# Patient Record
Sex: FEMALE | Race: WHITE | NOT HISPANIC OR LATINO | Employment: FULL TIME | URBAN - METROPOLITAN AREA
[De-identification: names, ages, dates, MRNs, and addresses within clinical notes are randomized per-mention and may not be internally consistent; named-entity substitution may affect disease eponyms.]

---

## 2017-05-02 ENCOUNTER — OFFICE VISIT (OUTPATIENT)
Dept: OBGYN | Facility: CLINIC | Age: 22
End: 2017-05-02
Payer: COMMERCIAL

## 2017-05-02 VITALS
WEIGHT: 142.1 LBS | SYSTOLIC BLOOD PRESSURE: 122 MMHG | DIASTOLIC BLOOD PRESSURE: 86 MMHG | HEART RATE: 71 BPM | OXYGEN SATURATION: 100 % | BODY MASS INDEX: 19.54 KG/M2 | TEMPERATURE: 97.7 F

## 2017-05-02 DIAGNOSIS — Z30.011 ENCOUNTER FOR INITIAL PRESCRIPTION OF CONTRACEPTIVE PILLS: Primary | ICD-10-CM

## 2017-05-02 PROCEDURE — 99203 OFFICE O/P NEW LOW 30 MIN: CPT | Performed by: OBSTETRICS & GYNECOLOGY

## 2017-05-02 RX ORDER — NORGESTIMATE AND ETHINYL ESTRADIOL 7DAYSX3 28
1 KIT ORAL DAILY
Qty: 84 TABLET | Refills: 3 | Status: SHIPPED | OUTPATIENT
Start: 2017-05-02 | End: 2018-03-19

## 2017-05-02 NOTE — PROGRESS NOTES
Crystal is a 21 year old  referred here by self for consultation regarding contraception.  She is getting  on 17. Has never been sexually active. Menses surjit regular. LMP of 17  Denies any pelvic pains or problems. No abnormal vaginal discharge.    ROS: Ten point review of systems was reviewed and negative except the above.    Gyne: - abn pap (last pap ), - STD's    Past Medical History:   Diagnosis Date     Pneumonia due to other specified bacteria(482.89)     X2     History reviewed. No pertinent surgical history.  Patient Active Problem List   Diagnosis     Other affections of shoulder region, not elsewhere classified     Pain in joint, shoulder region     Fibroma of bone-nonossifying      Leg pain, medial     Molluscum contagiosum     Acne       ALL/Meds: Her medication and allergy histories were reviewed and are documented in their appropriate chart areas.    SH: - tob, - EtOH, single    FH: Her family history was reviewed and documented in its appropriate chart area.    PE: /86  Pulse 71  Temp 97.7  F (36.5  C) (Oral)  Wt 64.5 kg (142 lb 1.6 oz)  LMP 2017  SpO2 100%  BMI 19.54 kg/m2  Body mass index is 19.54 kg/(m^2).      General:  WNWD female, NAD  Alert  Oriented x 3  Gait:  Normal  Skin:  Normal skin turgor  HEENT:  NC/AT, EOMI  Abdomen:  Non-tender, non-distended.  Pelvic exam:  Not performed  Extremities:  No clubbing, no cyanosis and no edema.      A/P    ICD-10-CM    1. Encounter for initial prescription of contraceptive pills Z30.011 norgestim-eth estrad triphasic (TRINESSA, 28,) 0.18/0.215/0.25 MG-35 MCG per tablet     Reviewed the risks, benefits , alternatives and side effects of birth control options including abstinence, oral contraceptives, transdermal patch, transvaginal ring, Depo-Provera, IUD, hormonal implants, condoms, diaphrams,and permanent sterilization.  Reviewed need for back-up contraception for the first month of hormonal methods.  Reviewed that  only abstinence and condoms provide protection from STD's.  Patient desires combination pills  for birth control.     She will take the first month continueosly to hopefully prevent menses on her honeymoon.   Discussed possible breakthrough bleeding.    25 minutes was spent face to face with the patient today discussing her history, diagnosis, and follow-up plan as noted above.  Over 50% of the visit was spent in counseling and coordination of care.    Total Visit Time: 30 minutes.      CEPHAS AGBEH, MD.

## 2017-05-02 NOTE — NURSING NOTE
"Chief Complaint   Patient presents with     Consult     Birth Control       Initial /86  Pulse 71  Temp 97.7  F (36.5  C) (Oral)  Wt 64.5 kg (142 lb 1.6 oz)  LMP 04/30/2017  SpO2 100%  BMI 19.54 kg/m2 Estimated body mass index is 19.54 kg/(m^2) as calculated from the following:    Height as of 7/2/13: 1.816 m (5' 11.5\").    Weight as of this encounter: 64.5 kg (142 lb 1.6 oz).  Medication Reconciliation: complete   Radhika Cervantes CMA      "

## 2017-05-02 NOTE — MR AVS SNAPSHOT
After Visit Summary   5/2/2017    Crystal Thurston    MRN: [id number]           Patient Information     Date Of Birth          1995        Visit Information        Provider Department      5/2/2017 3:30 PM Agbeh, Cephas Mawuena, MD [facility]        Today's Diagnoses     Encounter for initial prescription of contraceptive pills    -  1      Care Instructions                                                           If you have any questions regarding your visit, Please contact your care team.    Women s Health CLINIC HOURS TELEPHONE NUMBER   Cephas Agbeh, M.D.    Jayda Sher - RN    Natasha -         Monday-[facility]  8:00 am - 5 pm  Tuesday- [facility]  8:00am- 5 pm  Wednesday- Off  Thursday- [facility]  8:00 am- 5 pm  Friday-[place]  8:00 am 5 pm [facility]  [address]  [place], [place] [id number] [phone] ask for Women's Clinic    Imaging [phone]    [facility]  [address]  [place] [address] [phone]  Imaging [phone]     Urgent Care locations:    [facility] Saturday and Sunday   9 am - 5 pm    Monday-Friday   12 pm - 8 pm  Saturday and Sunday   9 am - 5 pm   [phone]       If you need a medication refill, please contact your pharmacy. Please allow 3 business days for your refill to be completed.  As always, Thank you for trusting us with your healthcare needs!                Follow-ups after your visit        Who to contact     If you have questions or need follow up information about today's clinic visit or your schedule please contact [facility] directly at [phone].  Normal or non-critical lab and imaging results will be communicated to you by MyChart, letter or phone within 4 business days after the clinic has received the results. If you do not hear from us within 7 days, please  contact the clinic through [employer] or phone. If you have a critical or abnormal lab result, we will notify you by phone as soon as possible.  Submit refill requests through [employer] or call your pharmacy and they will forward the refill request to us. Please allow 3 business days for your refill to be completed.          Additional Information About Your Visit        [employer] Information     [employer] gives you secure access to your electronic health record. If you see a primary care provider, you can also send messages to your care team and make appointments. If you have questions, please call your primary care clinic.  If you do not have a primary care provider, please call [phone] and they will assist you.        Care EveryWhere ID     This is your Care EveryWhere ID. This could be used by other organizations to access your [facility] medical records  [id number]        Your Vitals Were     Pulse Temperature Last Period Pulse Oximetry BMI (Body Mass Index)       71 97.7  F (36.5  C) (Oral) 04/30/2017 100% 19.54 kg/m2        Blood Pressure from Last 3 Encounters:   05/02/17 122/86   07/02/13 116/80   05/15/13 110/70    Weight from Last 3 Encounters:   05/02/17 64.5 kg (142 lb 1.6 oz)   07/02/13 68 kg (150 lb) (83 %)*   05/15/13 68.1 kg (150 lb 3.2 oz) (83 %)*     * Growth percentiles are based on [facility] 2-20 Years data.              Today, you had the following     No orders found for display         Today's Medication Changes          These changes are accurate as of: 5/2/17  4:39 PM.  If you have any questions, ask your nurse or doctor.               Start taking these medicines.        Dose/Directions    norgestim-eth estrad triphasic 0.18/0.215/0.25 MG-35 MCG per tablet   Commonly known as:  TRINESSA (28)   Used for:  Encounter for initial prescription of contraceptive pills   Started by:  Agbeh, Cephas Mawuena, MD        Dose:  1 tablet   Take 1 tablet by mouth daily   Quantity:  84 tablet   Refills:  3             Where to get your medicines      These medications were sent to [facility] [address] [facility] - [place] - [address]  [address] [place] [place] [address]     Phone:  [phone]     norgestim-eth estrad triphasic 0.18/0.215/0.25 MG-35 MCG per tablet                Primary Care Provider Office Phone # Fax #    [facility] [phone] [address]  [place] [address]        Thank you!     Thank you for choosing [facility]  for your care. Our goal is always to provide you with excellent care. Hearing back from our patients is one way we can continue to improve our services. Please take a few minutes to complete the written survey that you may receive in the mail after your visit with us. Thank you!             Your Updated Medication List - Protect others around you: Learn how to safely use, store and throw away your medicines at www.disposemymeds.org.          This list is accurate as of: 5/2/17  4:39 PM.  Always use your most recent med list.                   Brand Name Dispense Instructions for use    albuterol 108 (90 BASE) MCG/ACT Inhaler    PROAIR HFA/PROVENTIL HFA/VENTOLIN HFA    1 Inhaler    Inhale 2 puffs into the lungs every 6 hours as needed for shortness of breath / dyspnea (use before exercise).       clindamycin 1 % lotion    CLEOCIN T    60 mL    To irritated, red areas of armpits and legs daily after shaving until resolved.       desonide 0.05 % lotion    DESOWEN    60 mL    Apply topically 2 times daily To affected areas at the armpits, back, and legs until resolved.       IBUPROFEN      2-3 tablets as needed Reported on 5/2/2017       norgestim-eth estrad triphasic 0.18/0.215/0.25 MG-35 MCG per tablet    TRINESSA (28)    84 tablet    Take 1 tablet by mouth daily       sertraline 50 MG tablet    ZOLOFT     Take 75 mg by mouth       tretinoin 0.025 % cream    RETIN-A    45 g    Use every night       TYLENOL 8 HOUR PO       Take by mouth as needed Reported on 5/2/2017

## 2017-05-02 NOTE — PATIENT INSTRUCTIONS
If you have any questions regarding your visit, Please contact your care team.    Women s Health CLINIC HOURS TELEPHONE NUMBER   Gms Agbeh, M.D.    Jayda Armstrong- KRISTINA Sher - KRISTINA Kaur -         Monday-[facility]  8:00 am - 5 pm  Tuesday- [facility]  8:00am- 5 pm  Wednesday- Off  Thursday- [facility]  8:00 am- 5 pm  Friday-[place]  8:00 am 5 pm [facility]  [address] [place] [address]  [phone] ask for Women's [facility]    Imaging [phone]    [facility]  [address]  [place] [address] [phone]  Imaging [phone]     Urgent Care locations:    [facility] Saturday and Sunday   9 am - 5 pm    Monday-Friday   12 pm - 8 pm  Saturday and Sunday   9 am - 5 pm   [phone]       If you need a medication refill, please contact your pharmacy. Please allow 3 business days for your refill to be completed.  As always, Thank you for trusting us with your healthcare needs!

## 2017-09-17 ENCOUNTER — HEALTH MAINTENANCE LETTER (OUTPATIENT)
Age: 22
End: 2017-09-17

## 2018-03-12 DIAGNOSIS — Z30.011 ENCOUNTER FOR INITIAL PRESCRIPTION OF CONTRACEPTIVE PILLS: ICD-10-CM

## 2018-03-12 RX ORDER — NORGESTIMATE AND ETHINYL ESTRADIOL 7DAYSX3 28
1 KIT ORAL DAILY
Qty: 84 TABLET | Refills: 3 | Status: CANCELLED | OUTPATIENT
Start: 2018-03-12

## 2018-03-12 NOTE — TELEPHONE ENCOUNTER
Requested Prescriptions   Pending Prescriptions Disp Refills     norgestim-eth estrad triphasic (TRINESSA, 28,) 0.18/0.215/0.25 MG-35 MCG per tablet  Last Written Prescription Date:  12/20/17  Last Fill Quantity: 84,  # refills: 2   Last office visit: 5/2/2017 with prescribing provider:  C. Agbeh   Future Office Visit:     84 tablet 3     Sig: Take 1 tablet by mouth daily    There is no refill protocol information for this order

## 2018-03-19 ENCOUNTER — TELEPHONE (OUTPATIENT)
Dept: OBGYN | Facility: CLINIC | Age: 23
End: 2018-03-19

## 2018-03-19 DIAGNOSIS — Z30.011 ENCOUNTER FOR INITIAL PRESCRIPTION OF CONTRACEPTIVE PILLS: ICD-10-CM

## 2018-03-19 RX ORDER — NORGESTIMATE AND ETHINYL ESTRADIOL 7DAYSX3 28
1 KIT ORAL DAILY
Qty: 84 TABLET | Refills: 0 | Status: SHIPPED | OUTPATIENT
Start: 2018-03-19

## 2018-03-19 NOTE — TELEPHONE ENCOUNTER
norgestim-eth estrad triphasic (TRINESSA, 28,) 0.18/0.215/0.25 MG-35 MCG per tablet 84 tablet 3 5/2/2017  --   Sig: Take 1 tablet by mouth daily     Last WWE was on 05-02-17  Phone call to pharmacy at 1045 and spoke to Suzette at [facility]. She stated Rx was transferred to [facility].   Left message for patient that it seems she is needing a RF as she started BCP consecutively in the beginning in attempt to postpone her period on her honeymoon. Left message that RF is being sent to her pharmacy and reminded patient that she needs to be seen for WWE on or after 05-03-18. Left call back number to schedule an appt.   Prescription approved per [facility] Refill Protocol.  Nikky Pandey RN, BAN

## 2020-02-23 ENCOUNTER — HEALTH MAINTENANCE LETTER (OUTPATIENT)
Age: 25
End: 2020-02-23

## 2020-09-18 ENCOUNTER — MEDICAL CORRESPONDENCE (OUTPATIENT)
Dept: HEALTH INFORMATION MANAGEMENT | Facility: CLINIC | Age: 25
End: 2020-09-18

## 2020-09-18 ENCOUNTER — TRANSFERRED RECORDS (OUTPATIENT)
Dept: HEALTH INFORMATION MANAGEMENT | Facility: CLINIC | Age: 25
End: 2020-09-18

## 2020-09-24 ENCOUNTER — TELEPHONE (OUTPATIENT)
Dept: OTOLARYNGOLOGY | Facility: CLINIC | Age: 25
End: 2020-09-24

## 2020-09-24 ENCOUNTER — TRANSCRIBE ORDERS (OUTPATIENT)
Dept: OTHER | Age: 25
End: 2020-09-24

## 2020-09-24 DIAGNOSIS — R09.89 THROAT CLEARING: ICD-10-CM

## 2020-09-24 DIAGNOSIS — R05.9 COUGH: Primary | ICD-10-CM

## 2020-09-24 NOTE — TELEPHONE ENCOUNTER
[facility] Call Center    Phone Message    May a detailed message be left on voicemail: yes     Reason for Call: Appointment Intake    Referring Provider Name: Referred by Brenton CRUZ  Diagnosis and/or Symptoms: Cough   Throat clearing     Referred to Dr Carcamo who is listed under Cough but not Throat Clearing and Pt said biggest issue is Throat Clearing - she is willing to see anyone that sees for this so not just Dr Carcamo - she wants first available in person - when I went to do the Panel it is still broken - notified Isa and she said to let you know to call Pt to please schedule since Throat Panel scheduling template broken still - Pt declined Virtual Video visit as has been elsewhere and said needs Throat scope in person - please return call to schedule - Thanks - Pt will wait for call back for first available in person with any MD ENT provider for Throat Clearing - Thanks      Action Taken: Message routed to:  [facility] ENT    Travel Screening: Not Applicable

## 2020-09-25 NOTE — TELEPHONE ENCOUNTER
Called patient and left a VM.    Informed her that - at this time - the closest that we can get to an in person visit for the initial appointment w/ the throat and voice team is a HYBRID appointment with Dr. Carcamo. Explained that Dr. Carcamo will conduct the first half of the appointment via video, and then she will come into the room to assess throat.    Let patient know that I did go ahead and schedule for 11/09 with Dr. Carcamo to ensure that she is down for something (Dr Carcamo tends to book out), and provided appointment/clinic instructions, as well as the ENT call center number to call back if questions do arise.

## 2020-09-28 NOTE — TELEPHONE ENCOUNTER
FUTURE VISIT INFORMATION      FUTURE VISIT INFORMATION:    Date: 11/9/2020    Time: 3PM    Location: [facility]  REFERRAL INFORMATION:    Referring provider:  Brenton Horan    Referring providers clinic:  [facility]    Reason for visit/diagnosis  Cough, throat clearing - referred by Dr. Horan ([facility]), SAL slot use ok'd by Brinda    RECORDS REQUESTED FROM:       Clinic name Comments Records Status Imaging Status   [facility] 9/18/2020 note with Dr Brenton Horan req 9/28/2020  Sent to scan 9/29/2020    [facility]   10/8/2020 PH Monitoring with Vinny Figueroa MD  9/23/2020 Upper GI endoscopy  with Vinny Figueroa MD  Care Everywhere     [facility]   9/14/2020  Note from Ac Mei MD   Care Everywhere     [facility] 08/27/2020 note from Rahda Wise, JACKELYN, CNP   Care Everywhere    [facility] imaging  9/24/2020 CT Angio Chest  Care Everywhere req 9/28/2020 - PACS   [facility] imaging  1/25/2020 XR Chest  Care Everywhere req 9/28/2020 - PACS   ENT [facility] 10/13/2020 note from Leslie Hagen MD Care Everywhere            9/28/2020 10:48AM sent a fax to [facility] for recs, [facility] for images and [employer] [facility] for images - Amay   9/29/2020 10:152AM [facility] only has office notes with them, Upper Endcopsy was done with [facility] (care everywhere ), waiting for images- Amay   10/1/2020 11:27AM images from [facility] and [facility] in PACS - Amay

## 2020-11-09 ENCOUNTER — PRE VISIT (OUTPATIENT)
Dept: OTOLARYNGOLOGY | Facility: CLINIC | Age: 25
End: 2020-11-09

## 2020-12-06 ENCOUNTER — HEALTH MAINTENANCE LETTER (OUTPATIENT)
Age: 25
End: 2020-12-06

## 2020-12-29 ENCOUNTER — APPOINTMENT (OUTPATIENT)
Dept: URBAN - METROPOLITAN AREA CLINIC 259 | Age: 25
Setting detail: DERMATOLOGY
End: 2020-12-29

## 2020-12-29 DIAGNOSIS — R20.8 OTHER DISTURBANCES OF SKIN SENSATION: ICD-10-CM

## 2020-12-29 DIAGNOSIS — D22 MELANOCYTIC NEVI: ICD-10-CM

## 2020-12-29 PROBLEM — D48.5 NEOPLASM OF UNCERTAIN BEHAVIOR OF SKIN: Status: ACTIVE | Noted: 2020-12-29

## 2020-12-29 PROCEDURE — 11306 SHAVE SKIN LESION 0.6-1.0 CM: CPT | Mod: 76

## 2020-12-29 PROCEDURE — 11306 SHAVE SKIN LESION 0.6-1.0 CM: CPT

## 2020-12-29 PROCEDURE — 11301 SHAVE SKIN LESION 0.6-1.0 CM: CPT

## 2020-12-29 PROCEDURE — 99202 OFFICE O/P NEW SF 15 MIN: CPT | Mod: 25

## 2020-12-29 PROCEDURE — OTHER SHAVE REMOVAL: OTHER

## 2020-12-29 PROCEDURE — OTHER COUNSELING: OTHER

## 2020-12-29 ASSESSMENT — LOCATION DETAILED DESCRIPTION DERM
LOCATION DETAILED: RIGHT RIB CAGE
LOCATION DETAILED: RIGHT LATERAL TRAPEZIAL NECK
LOCATION DETAILED: EPIGASTRIC SKIN
LOCATION DETAILED: MID TRAPEZIAL NECK

## 2020-12-29 ASSESSMENT — LOCATION ZONE DERM
LOCATION ZONE: NECK
LOCATION ZONE: TRUNK

## 2020-12-29 ASSESSMENT — LOCATION SIMPLE DESCRIPTION DERM
LOCATION SIMPLE: POSTERIOR NECK
LOCATION SIMPLE: ABDOMEN
LOCATION SIMPLE: TRAPEZIAL NECK

## 2020-12-29 NOTE — PROCEDURE: SHAVE REMOVAL
Anesthesia Type: 1% lidocaine with epinephrine
X Size Of Lesion In Cm (Optional): 0
Anesthesia Volume In Cc: 0.5
Medical Necessity Information: It is in your best interest to select a reason for this procedure from the list below. All of these items fulfill various CMS LCD requirements except the new and changing color options.
Add Variable For Additional Medical Justification: No
Was A Bandage Applied: Yes
Hemostasis: Aluminum Chloride
Path Notes (To The Dermatopathologist): Please check margins.
Biopsy Method: Dermablade
Medical Necessity Clause: This procedure was medically necessary because the lesion that was treated was:
Post-Care Instructions: I reviewed with the patient in detail post-care instructions. Patient is to keep the biopsy site dry overnight, and then apply bacitracin twice daily until healed. Patient may apply hydrogen peroxide soaks to remove any crusting.
Billing Type: Third-Party Bill
Size Of Lesion In Cm (Required): 0.6
Wound Care: Petrolatum
Detail Level: Detailed
Notification Instructions: Patient will be notified of pathology results. However, patient instructed to call the office if not contacted within 2 weeks.
Consent was obtained from the patient. The risks and benefits to therapy were discussed in detail. Specifically, the risks of infection, scarring, bleeding, prolonged wound healing, incomplete removal, allergy to anesthesia, nerve injury and recurrence were addressed. Prior to the procedure, the treatment site was clearly identified and confirmed by the patient. All components of Universal Protocol/PAUSE Rule completed.

## 2021-04-11 ENCOUNTER — HEALTH MAINTENANCE LETTER (OUTPATIENT)
Age: 26
End: 2021-04-11

## 2021-06-14 ENCOUNTER — HOSPITAL ENCOUNTER (EMERGENCY)
Facility: CLINIC | Age: 26
Discharge: HOME | End: 2021-06-14
Admitting: EMERGENCY MEDICINE
Payer: COMMERCIAL

## 2021-06-14 VITALS
HEART RATE: 101 BPM | TEMPERATURE: 98 F | OXYGEN SATURATION: 98 % | DIASTOLIC BLOOD PRESSURE: 73 MMHG | SYSTOLIC BLOOD PRESSURE: 133 MMHG | RESPIRATION RATE: 18 BRPM

## 2021-06-14 PROCEDURE — 93005 ELECTROCARDIOGRAM TRACING: CPT

## 2021-06-14 PROCEDURE — 999N000104 HC STATISTIC NO CHARGE

## 2021-06-14 NOTE — ED TRIAGE NOTES
Patient complaining of palpitations for the past few days.  States she recently increased bethanechol and feels this may be contributing.

## 2021-06-14 NOTE — ED NOTES
Pt reports she has been having steady palpations without irregular beats. Pt to follow up with PCP in AM.

## 2021-06-15 LAB — INTERPRETATION ECG - MUSE: NORMAL

## 2021-09-25 ENCOUNTER — HEALTH MAINTENANCE LETTER (OUTPATIENT)
Age: 26
End: 2021-09-25

## 2021-11-05 ENCOUNTER — TRANSFERRED RECORDS (OUTPATIENT)
Dept: HEALTH INFORMATION MANAGEMENT | Facility: CLINIC | Age: 26
End: 2021-11-05
Payer: COMMERCIAL

## 2021-11-07 ENCOUNTER — TRANSFERRED RECORDS (OUTPATIENT)
Dept: HEALTH INFORMATION MANAGEMENT | Facility: CLINIC | Age: 26
End: 2021-11-07
Payer: COMMERCIAL

## 2021-12-23 ENCOUNTER — PRE VISIT (OUTPATIENT)
Dept: UROLOGY | Facility: CLINIC | Age: 26
End: 2021-12-23

## 2021-12-23 NOTE — TELEPHONE ENCOUNTER
MEDICAL RECORDS REQUEST   [facility]  Urology Clinic  [address] [place] [address]  PHONE: [phone]  Fax: [phone]        FUTURE VISIT INFORMATION                                                   Crystal Flannery, : 1995 scheduled for future visit at [facility]    APPOINTMENT INFORMATION:    Date: 2022    Provider:  Skylar Palafox MD    Reason for Visit/Diagnosis: Pelvic Pain & Frequency    REFERRAL INFORMATION:    Referring provider:  N/A    Specialty: N/A    Referring providers clinic:  N/A    Clinic contact number:  N/A    RECORDS REQUESTED FOR VISIT                                                     NOTES  STATUS/DETAILS   OFFICE NOTE from referring provider  no   OFFICE NOTE from other specialist  yes   DISCHARGE SUMMARY from hospital  no   DISCHARGE REPORT from the ER  yes   OPERATIVE REPORT  yes   MEDICATION LIST  yes   LABS     URINALYSIS (UA)  yes   URINE CYTOLOGY  no   IMAGING (IMAGES & REPORT)  resolved -- 2021 -- CT ABD/Pelvis [facility]  2021 -- US Pelvis -- [facility]     PRE-VISIT CHECKLIST      Record collection complete yes   Appointment appropriately scheduled           (right time/right provider) Yes   Joint diagnostic appointment coordinated correctly          (ensure right order & amount of time) Yes   MyChart activation Yes   Questionnaire complete If no, please explain pending     Action 2021 JTV 12:45am   Action Taken [facility] sent a request to [facility] for images to be pushed over to [facility] PACS.      Action 2021 JTV 1:11pm   Action Taken [facility] sent a 2nd request to [facility] for images.    @ 3:30pm, [facility] received Images under different last name. [facility] called patient to confirm if she has another last name. Patient did not . [facility] LVM for patient to return call.     Images were pushed under: Crystal Thurston in PACS.     Action 2021  JTV 4:22pm   Action Taken [facility] received images from [facility] and [facility]. Images have been resolved.

## 2021-12-27 ENCOUNTER — PRE VISIT (OUTPATIENT)
Dept: UROLOGY | Facility: CLINIC | Age: 26
End: 2021-12-27
Payer: COMMERCIAL

## 2021-12-27 NOTE — TELEPHONE ENCOUNTER
Reason for Visit: Consult    Diagnosis: Pelvic Pain, Urinary Frequency    Orders/Procedures/Records: in system    Contact Patient: n/a    Rooming Requirements: Normal      Elycia Baeza  12/27/21  11:24 AM

## 2022-01-05 ENCOUNTER — VIRTUAL VISIT (OUTPATIENT)
Dept: UROLOGY | Facility: CLINIC | Age: 27
End: 2022-01-05
Payer: COMMERCIAL

## 2022-01-05 DIAGNOSIS — R10.2 PELVIC PAIN IN FEMALE: Primary | ICD-10-CM

## 2022-01-05 PROCEDURE — 99204 OFFICE O/P NEW MOD 45 MIN: CPT | Mod: 95 | Performed by: UROLOGY

## 2022-01-05 RX ORDER — HYDROXYZINE PAMOATE 25 MG/1
CAPSULE ORAL
COMMUNITY
Start: 2021-11-22

## 2022-01-05 RX ORDER — PANTOPRAZOLE SODIUM 20 MG/1
TABLET, DELAYED RELEASE ORAL
COMMUNITY
Start: 2021-04-08

## 2022-01-05 RX ORDER — DIAZEPAM 2.5 MG/.5ML
GEL RECTAL
Qty: 10 SUPPOSITORY | Refills: 0 | Status: SHIPPED | OUTPATIENT
Start: 2022-01-05 | End: 2022-04-13

## 2022-01-05 RX ORDER — DIAZEPAM 2.5 MG/.5ML
GEL RECTAL
Qty: 10 SUPPOSITORY | Refills: 0 | Status: SHIPPED | OUTPATIENT
Start: 2022-01-05 | End: 2022-01-05

## 2022-01-05 RX ORDER — OXYBUTYNIN CHLORIDE 5 MG/1
TABLET, EXTENDED RELEASE ORAL
COMMUNITY

## 2022-01-05 NOTE — PROGRESS NOTES
HPI:  Crystal Flannery is a 26 year old female being seen for bladder pain.  Her symptoms started Oct 21st, 2021.  Right before that she had been squatting and doing heavy lifting and exercising and she started to feel some urinary frequency and some discomfort after voiding.  She was prescribed antibiotics for 5 days and her symptoms resolved.  On Nov 2nd she started to do weight lifting again and the next morning she got her Covid Booster and she got the symptoms came back that afternoon.  The symptoms have persisted.  She had UA, Wet prep, u/s and CT scan all of which did not help her.    Reviewed previous notes from 11/7/21 from [facility] ED    Exam:  There were no vitals taken for this visit.  GENERAL: Healthy, alert and no distress  EYES: Eyes grossly normal to inspection.  No discharge or erythema, or obvious scleral/conjunctival abnormalities.  RESP: No audible wheeze, cough, or visible cyanosis.  No visible retractions or increased work of breathing.    SKIN: Visible skin clear. No significant rash, abnormal pigmentation or lesions.  NEURO: Cranial nerves grossly intact.  Mentation and speech appropriate for age.  PSYCH: Mentation appears normal, affect normal/bright, judgement and insight intact, normal speech and appearance well-groomed.    Review of Imaging:  The following imaging exams were independently viewed and interpreted by me and discussed with patient:  CT abd/pelvis 11/7/21  normal    Review of Labs:  The following labs were reviewed by me and discussed with the patient:  11/13/21 labs  Cr 0.84 BMP normal  HgbA1C 5.1  UA normal except elevated glucose       Assessment & Plan   27 y/o female with urinary frequency and pelvic pain related to pelvic floor dysfunction with some back and pelvic issues.  We discussed options of observation, PT, other medication, and vaginal valium.  She would like to try the vaginal valium and the PT for now.  -vaginal valium  -agree with PFPT, she is starting  tomorrow  -f/u in 3 months    Skylar Palafox MD  [facility] UROLOGY [facility]      ==========================      Additional Coding Information:  Time spent:  50 minutes spent on the date of the encounter doing chart review, history and exam, documentation and further activities per the note

## 2022-01-05 NOTE — LETTER
1/5/2022       RE: Crystal Flannery  [address]  [facility] [address]     Dear Colleague,    Thank you for referring your patient, Crystal Flannery, to the [facility] [place] [facility]. Please see a copy of my visit note below.    Crystal is a 26 year old who is being evaluated via a billable video visit.      How would you like to obtain your AVS? MyChart  If the video visit is dropped, the invitation should be resent by: Send to e-mail at: [email or web address]  Will anyone else be joining your video visit? No      Video Start Time: 1:29 PM  Video-Visit Details    Type of service:  Video Visit    Video End Time:1:48 PM    Originating Location (pt. Location): Home    Distant Location (provider location):  [facility] [place]     Platform used for Video Visit: PlaySight    HPI:  Crystal Flannery is a 26 year old female being seen for bladder pain.  Her symptoms started Oct 21st, 2021.  Right before that she had been squatting and doing heavy lifting and exercising and she started to feel some urinary frequency and some discomfort after voiding.  She was prescribed antibiotics for 5 days and her symptoms resolved.  On Nov 2nd she started to do weight lifting again and the next morning she got her Covid Booster and she got the symptoms came back that afternoon.  The symptoms have persisted.  She had UA, Wet prep, u/s and CT scan all of which did not help her.    Reviewed previous notes from 11/7/21 from [facility] ED    Exam:  There were no vitals taken for this visit.  GENERAL: Healthy, alert and no distress  EYES: Eyes grossly normal to inspection.  No discharge or erythema, or obvious scleral/conjunctival abnormalities.  RESP: No audible wheeze, cough, or visible cyanosis.  No visible retractions or increased work of breathing.    SKIN: Visible skin clear. No significant rash, abnormal pigmentation or  lesions.  NEURO: Cranial nerves grossly intact.  Mentation and speech appropriate for age.  PSYCH: Mentation appears normal, affect normal/bright, judgement and insight intact, normal speech and appearance well-groomed.    Review of Imaging:  The following imaging exams were independently viewed and interpreted by me and discussed with patient:  CT abd/pelvis 11/7/21  normal    Review of Labs:  The following labs were reviewed by me and discussed with the patient:  11/13/21 labs  Cr 0.84 BMP normal  HgbA1C 5.1  UA normal except elevated glucose       Assessment & Plan   25 y/o female with urinary frequency and pelvic pain related to pelvic floor dysfunction with some back and pelvic issues.  We discussed options of observation, PT, other medication, and vaginal valium.  She would like to try the vaginal valium and the PT for now.  -vaginal valium  -agree with PFPT, she is starting tomorrow  -f/u in 3 months    Skylar Palafox MD  [facility] UROLOGY CLINIC [place]      ==========================      Additional Coding Information:  Time spent:  50 minutes spent on the date of the encounter doing chart review, history and exam, documentation and further activities per the note                Again, thank you for allowing me to participate in the care of your patient.      Sincerely,    Skylar Palafox MD

## 2022-01-05 NOTE — PROGRESS NOTES
Isaac is a 26 year old who is being evaluated via a billable video visit.      How would you like to obtain your AVS? MyChart  If the video visit is dropped, the invitation should be resent by: Send to e-mail at: [email or web address]  Will anyone else be joining your video visit? No      Video Start Time: 1:29 PM  Video-Visit Details    Type of service:  Video Visit    Video End Time:1:48 PM    Originating Location (pt. Location): Home    Distant Location (provider location):  [facility] [place]     Platform used for Video Visit: aDealio

## 2022-01-05 NOTE — LETTER
Date:January 5, 2022      Patient was self referred, no letter generated. Do not send.        [facility] Health Information

## 2022-01-06 ENCOUNTER — THERAPY VISIT (OUTPATIENT)
Dept: PHYSICAL THERAPY | Facility: CLINIC | Age: 27
End: 2022-01-06
Payer: COMMERCIAL

## 2022-01-06 ENCOUNTER — TELEPHONE (OUTPATIENT)
Dept: UROLOGY | Facility: CLINIC | Age: 27
End: 2022-01-06

## 2022-01-06 DIAGNOSIS — R10.2 PELVIC PAIN IN FEMALE: ICD-10-CM

## 2022-01-06 DIAGNOSIS — M99.05 SOMATIC DYSFUNCTION OF PELVIS REGION: ICD-10-CM

## 2022-01-06 PROCEDURE — 97110 THERAPEUTIC EXERCISES: CPT | Mod: GP | Performed by: PHYSICAL THERAPIST

## 2022-01-06 PROCEDURE — 97161 PT EVAL LOW COMPLEX 20 MIN: CPT | Mod: GP | Performed by: PHYSICAL THERAPIST

## 2022-01-06 PROCEDURE — 97535 SELF CARE MNGMENT TRAINING: CPT | Mod: GP | Performed by: PHYSICAL THERAPIST

## 2022-01-06 PROCEDURE — 97112 NEUROMUSCULAR REEDUCATION: CPT | Mod: GP | Performed by: PHYSICAL THERAPIST

## 2022-01-06 NOTE — TELEPHONE ENCOUNTER
Left message with pharmacy due to long hold time. Left detailed message clarifying medication should be suppository, not gel. To call back at [phone].     Sita Hall RN MSN

## 2022-01-06 NOTE — PROGRESS NOTES
SUBJECTIVE:  Patient reports onset of symptoms Urinary frequency, pelvic pain on 10/21/21.Has always had pain with intercourse.  Played [employer] at [place].   Recently started weight lifting.  Had had some left low back pain and had gone to PT.  .Symptoms include irritation of urethra, better with anti-biotics with negative urine cultures.  Since onset symptoms have been getting better, worse or staying the same? About this same.Pain worse with sitting and at night.  Walking is OK.  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 This is stressful.  Did go off hormonal birth control.   Has a stand up desk.  Did a bladder diary. Pain is the worst after urinating and in evening.     Went to [facility] for PT a few months ago.  Also had some external massage.  Has a pelvic floor wand.     Also has hx of esophogeal issues perhaps following a virus  Urination:  Do you leak on the way to the bathroom or with a strong urge to void? No    Do you leak with cough,sneeze, jumping, running?No   Any other activities that cause leaking? No   Do you have triggers that make you feel you can't wait to go to the bathroom? No   what are theyNA.  Type of pad and number used per day? 0  When you leak what is the amount? 0    How long can you delay the need to urinate? depends.   How many times do you get up to urinate at night? 0-1    Can you stop the flow of urine when on the toilet? Yes  Is the volume of urine passed usually: medium. (8sec rule=  250ml with average bladder storing  400-600ml)    Do you strain to pass urine? yes  Do you have a slow or hesitant urinary stream? Yes  Do you have difficulty initiating the urine stream? Yes  Is urination painful?  Yes    How many bladder infections have you had in last 12 months?0    Fluid intake(one glass is 8oz or one cup) 8glasses/day, 0caffinated glasses/day  0 alcohol glasses/day.    Bowel habits:  Frequency of bowel movements? 7 times a week  Consistancy of stool? soft  formed, [place] Stool Scale no  Do you ignore the urge to defecate? No  Do you strain to pass stool? Yes, takes Miralax    Pelvic Pain:  Do you have any pelvic pain with intercourse, exams, use of tampons? Yes  Is initial penetration during intercourse painful? Yes  Is deeper penetration painful? Yes  Do you use lubricant?   Yes What kind? Water based      Given birth? No Any complications? No  # of vaginal delieveries?0     # of C-sections?0    # of episiotomies?0  .  Are you sexually active?not now due to pain  Have you ever been worried for your physical safety? No  Any abdominal or pelvic surgeries? none  Are you having any regular exercise?cycling, walking,   Have you practiced the PF(kegel) exercises for 4 or more weeks?no  Thyroid checked?no(related to hair loss, flu-like symptoms, wt gain/loss, fatigue, menopause)  Changed diet lately?yes, elimination diet    OBSERVATION  Lumbar Posture: Negative  Pelvic symmetry: Positive  Introitus: tight  Trendelenberg Sign:Negative    Had MRI of lumbar spine.  Will have a CT scan and MRI of pelvis.  INtravaginal US was fine.      ROM  Single leg standing unilateral hip flexion PSIS:Negative  Standing forward flexion PSIS:Negative  Passive Hip ROM:Positive  VIRI:Negative  VIRI with OP:Negative  Posterior Sacral Shear:Negative    MUSCEL PERFORMANCE  Active SLR:Positive  Abdominal Core 90/90 hip lower:Negative  Baseline PF tone:hyper  PF Tone with cough: not tested  Valsalva: not tested  PF Response quality: sluggish  PF Power: Center: 3 Stronger on right/left left.  Endurance: Maximum contraction in seconds: 2  # of endurance contractions before fatigue: 3  Quick contraction repetitions prior to fatigue: 3.  Specificity/accessory muscles: Not Tested, Synergy with abdominals: Not Tested.  Prolapse: Cyctocele/Rectocele/Uterine stgstrstastdstest:st st1st Urethrocele: none, Enterocele: none.    PALPATION: Reproduction of symptoms with digital evaluations    Decreased hip IR, positive hip  imingementst    Marinoff Scale:Level 3  (Level 3: Abstinence from intercourse because of severe pain. Level 2: Painful intercourse which limites frequency of activity. Level 1: Painful intercourse not severe enough to prevent activity.)    Physical Therapy Initial Evaluation  Subjective:  The history is provided by the patient. No  was used.                       Objective:  Standing Alignment:          Pelvic:  Iliac crest high R                                                 Pelvic Dysfunction Evaluation:          Abdominal Wall:  Abdominal wall pelvic: guarding.        Pelvic Clock Exam:    Ischiocavernosis pain:  ++  Bulbocavernosis pain:  ++  Transverse Perineal:  ++  Levator ANI:  +  Perineal Body:  ++  SI Provocation:  NA        Reflex Testing:  normal    External Assessment:  normal              Internal Assessment:    Sensory Exam:  Normal  Contraction/Grade:  Weak squeeze, 2 second hold (2)          SEMG Biofeedback:    Equipment:  [email or web address] with computer            Sustained contraction:  2 sec with difficulty relaxing                           General     ROS    Assessment/Plan:    Patient is a 26 year old female with pelvic complaints.    Patient has the following significant findings with corresponding treatment plan.                Diagnosis 1:  Pelvic dysfunction  Pain -  manual therapy, self management, education and home program  Decreased ROM/flexibility - manual therapy, therapeutic exercise and home program  Impaired muscle performance - biofeedback, neuro re-education and home program  Decreased function - therapeutic activities and home program    Therapy Evaluation Codes:   1) History comprised of:   Personal factors that impact the plan of care:      Time since onset of symptoms.    Comorbidity factors that impact the plan of care are:      None.     Medications impacting care: None.  2) Examination of Body Systems comprised of:   Body structures and functions that impact the  plan of care:      Pelvis.   Activity limitations that impact the plan of care are:      Frequency, Pelvic Exam, [place] and Urgency.  3) Clinical presentation characteristics are:   Stable/Uncomplicated.  4) Decision-Making    Low complexity using standardized patient assessment instrument and/or measureable assessment of functional outcome.  Cumulative Therapy Evaluation is: Low complexity.    Previous and current functional limitations:  (See Goal Flow Sheet for this information)    Short term and Long term goals: (See Goal Flow Sheet for this information)     Communication ability:  Patient appears to be able to clearly communicate and understand verbal and written communication and follow directions correctly.  Treatment Explanation - The following has been discussed with the patient:   RX ordered/plan of care  Anticipated outcomes  Possible risks and side effects  This patient would benefit from PT intervention to resume normal activities.   Rehab potential is excellent.    Frequency:  2 X a month, once daily  Duration:  for 3 months  Discharge Plan:  Achieve all LTG.  Independent in home treatment program.  Reach maximal therapeutic benefit.    Please refer to the daily flowsheet for treatment today, total treatment time and time spent performing 1:1 timed codes.

## 2022-01-06 NOTE — TELEPHONE ENCOUNTER
[facility] Call Center    Phone Message    May a detailed message be left on voicemail: yes     Reason for Call: Medication Question or concern regarding medication   Prescription Clarification  Name of Medication: diazepam 10 mg SUPP  Prescribing Provider:    Pharmacy: [id number] [address] AT [address] [place] [address]   What on the order needs clarification? [facility] is calling, they wanted to know if pt needs the gel form of this medication, although that might interfere with pts PH balance, or rectal suppository , please call [facility] at [phone] for clarification on medication, thank you           Action Taken: Message routed to:  [facility] uro    Travel Screening: Not Applicable

## 2022-01-07 NOTE — TELEPHONE ENCOUNTER
Attempted to call pharmacy. On hold for over 20 minutes and call was abruptly ended. Unable to leave message.     Attempted to call pt. Left detailed message to call clinic back at [phone].   Informed pt via message that medication is suppose to be in suppository form when she picks it up.

## 2022-01-10 NOTE — PROGRESS NOTES
Physical Therapy Initial Evaluation  Subjective:    Patient Health History           General health as reported by patient is good.  Pertinent medical history includes: anemia and depression.   Red flags:  Changes in bowel and bladder habits, pain at rest/night and foot drop.  Medical allergies: other. Other medical allergies details: amoxicillin.   Surgeries include:  Other. Other surgery history details: R shoulder.    Current medications:  Anti-depressants, anti-inflammatory, pain medication, sleep medication and other. Other medications details: oxybutyrin, protonix.    Current occupation is marketing.   Primary job tasks include:  Computer work and prolonged sitting.                                    Objective:  System    Physical Exam    General     ROS    Assessment/Plan:

## 2022-01-20 ENCOUNTER — THERAPY VISIT (OUTPATIENT)
Dept: PHYSICAL THERAPY | Facility: CLINIC | Age: 27
End: 2022-01-20
Payer: COMMERCIAL

## 2022-01-20 DIAGNOSIS — M99.05 SOMATIC DYSFUNCTION OF PELVIS REGION: ICD-10-CM

## 2022-01-20 DIAGNOSIS — R10.2 PELVIC PAIN IN FEMALE: ICD-10-CM

## 2022-01-20 PROCEDURE — 97140 MANUAL THERAPY 1/> REGIONS: CPT | Mod: GP | Performed by: PHYSICAL THERAPIST

## 2022-01-20 PROCEDURE — 97110 THERAPEUTIC EXERCISES: CPT | Mod: GP | Performed by: PHYSICAL THERAPIST

## 2022-01-20 PROCEDURE — 97535 SELF CARE MNGMENT TRAINING: CPT | Mod: GP | Performed by: PHYSICAL THERAPIST

## 2022-02-14 ENCOUNTER — THERAPY VISIT (OUTPATIENT)
Dept: PHYSICAL THERAPY | Facility: CLINIC | Age: 27
End: 2022-02-14
Payer: COMMERCIAL

## 2022-02-14 DIAGNOSIS — R10.2 PELVIC PAIN IN FEMALE: ICD-10-CM

## 2022-02-14 DIAGNOSIS — M99.05 SOMATIC DYSFUNCTION OF PELVIS REGION: ICD-10-CM

## 2022-02-14 PROCEDURE — 97110 THERAPEUTIC EXERCISES: CPT | Mod: GP | Performed by: PHYSICAL THERAPIST

## 2022-02-14 PROCEDURE — 97140 MANUAL THERAPY 1/> REGIONS: CPT | Mod: GP | Performed by: PHYSICAL THERAPIST

## 2022-02-15 ENCOUNTER — TELEPHONE (OUTPATIENT)
Dept: UROLOGY | Facility: CLINIC | Age: 27
End: 2022-02-15
Payer: COMMERCIAL

## 2022-02-15 NOTE — TELEPHONE ENCOUNTER
[facility] Call Center    Phone Message    May a detailed message be left on voicemail: yes     Reason for Call: Order(s): Other:   Reason for requested: Patient has PT scheduled.  Honey would like an order for PT sent to [facility] at fax: [phone]  Date needed: ASAP  Provider name: Dr. Augustine      Action Taken: Message routed to:  [facility] Urology    Travel Screening: Not Applicable

## 2022-02-15 NOTE — TELEPHONE ENCOUNTER
Nakita Earl LPN  You 1 hour ago (9:38 AM)     ES    Could you fax this?     Thank you!!     Nakita Earl LPN   Urology Clinic Service    [facility]      Writer faxed PT order from [facility] to the information provided below. Done 2/15/2022 at 10:50am.

## 2022-03-23 PROBLEM — M99.05 SOMATIC DYSFUNCTION OF PELVIS REGION: Status: RESOLVED | Noted: 2022-01-06 | Resolved: 2022-03-23

## 2022-03-23 NOTE — PROGRESS NOTES
Subjective:  HPI  Physical Exam                    Objective:  System    Physical Exam    General     ROS    Assessment/Plan:    DISCHARGE REPORT    Progress reporting period is from 1/6/2022 to 2/14/2022      SUBJECTIVE  Subjective changes noted by patient:  .  Subjective: Still off of the bladder medication.  Has had days that are better than others.  Some days when symptoms are more urinary.  Some times when days are more vaginal and then around the clitoris.  Has been doing some internal work regularly.  A glass of wine helps with relaxation.  Stretching helps as well. Still having some groin pain.  Has been using dilators.  Using the diltors.  Feeling this helps to relax the area.  Had MRI and bone scan of the pelvis.  Had MRI of leg as well.  Has multiple cysts.   Will see Dr. Teran. Needs hip x rays. Left groin pain worse with sitting.      Current pain level is NA  .     Previous pain level was  NA Initial Pain level: 5/10.   Changes in function:  Yes (See Goal flowsheet attached for changes in current functional level)  Adverse reaction to treatment or activity: None    OBJECTIVE  Changes noted in objective findings:  Yes,   Objective: Try timed voiding every 2 hours.  Increased tone left psoas and left bulbo muscle.  Sit with relaxed pelvic position, legs apart.  Try external massage along ischiocavernosus muscle as well.  Pain has improved to the left of the urethra.  Still has increased tone overall but could stretch muscle more easily after several minutes.  Discussed having no groin pain with any hip stretching.      ASSESSMENT/PLAN  Updated problem list and treatment plan: Diagnosis 1:  Pelvic pain  Pain -  manual therapy, self management, education and home program  Decreased ROM/flexibility - manual therapy, therapeutic exercise and home program  Impaired muscle performance - biofeedback, neuro re-education and home program  Decreased function - therapeutic activities and home program  STG/LTGs  have been met or progress has been made towards goals:  Yes (See Goal flow sheet completed today.)  Assessment of Progress: The patient's condition is improving.  The patient's condition has potential to improve.  Self Management Plans:  Patient has been instructed in a home treatment program.  I have re-evaluated this patient and find that the nature, scope, duration and intensity of the therapy is appropriate for the medical condition of the patient.  Crystal continues to require the following intervention to meet STG and LTG's:  Patient needs to continue to work on the home exercise program.      Recommendations:  This patient would benefit from further evaluation.    Please refer to the daily flowsheet for treatment today, total treatment time and time spent performing 1:1 timed codes.

## 2022-04-11 ENCOUNTER — TRANSFERRED RECORDS (OUTPATIENT)
Dept: HEALTH INFORMATION MANAGEMENT | Facility: CLINIC | Age: 27
End: 2022-04-11
Payer: COMMERCIAL

## 2022-04-12 ENCOUNTER — PRE VISIT (OUTPATIENT)
Dept: UROLOGY | Facility: CLINIC | Age: 27
End: 2022-04-12
Payer: COMMERCIAL

## 2022-04-12 NOTE — TELEPHONE ENCOUNTER
Reason for Visit: Follow-up    Diagnosis: Pelvic pain in female    Orders/Procedures/Records: in system    Contact Patient: n/a    Rooming Requirements: Normal      [facility]  04/12/22  4:25 PM

## 2022-04-13 ENCOUNTER — TELEPHONE (OUTPATIENT)
Dept: UROLOGY | Facility: CLINIC | Age: 27
End: 2022-04-13

## 2022-04-13 ENCOUNTER — VIRTUAL VISIT (OUTPATIENT)
Dept: UROLOGY | Facility: CLINIC | Age: 27
End: 2022-04-13
Payer: COMMERCIAL

## 2022-04-13 DIAGNOSIS — R10.2 PELVIC PAIN IN FEMALE: ICD-10-CM

## 2022-04-13 PROCEDURE — 99214 OFFICE O/P EST MOD 30 MIN: CPT | Mod: 95 | Performed by: UROLOGY

## 2022-04-13 RX ORDER — DIAZEPAM 2.5 MG/.5ML
GEL RECTAL
Qty: 12 SUPPOSITORY | Refills: 0 | Status: SHIPPED | OUTPATIENT
Start: 2022-04-13

## 2022-04-13 RX ORDER — BUPROPION HYDROCHLORIDE 75 MG/1
TABLET ORAL
COMMUNITY
Start: 2022-03-23

## 2022-04-13 RX ORDER — FERROUS SULFATE 325(65) MG
325 TABLET ORAL
COMMUNITY
Start: 2020-07-21

## 2022-04-13 NOTE — LETTER
4/13/2022       RE: Crystal Flannery  [address]  [place] [facility] [address]     Dear Colleague,    Thank you for referring your patient, Crystal Flannery, to the [facility]OLOG[facility] [place] [facility]. Please see a copy of my visit note below.    Crystal is a 26 year old who is being evaluated via a billable video visit.      How would you like to obtain your AVS? MyChart  If the video visit is dropped, the invitation should be resent by: Send to e-mail at: [email or web address]  Will anyone else be joining your video visit? No      Video Start Time: 11:45 AM  Video-Visit Details    Type of service:  Video Visit    Video End Time:11:57 AM    Originating Location (pt. Location): Home    Distant Location (provider location):  [facility] UROLOGY CLINIC [place]     Platform used for Video Visit: Plum (Formerly Ube)    Reason for Visit:  Crystal Flannery is a 26 year old female being seen for bladder pain.  Her symptoms started Oct 21st, 2021.  Right before that she had been squatting and doing heavy lifting and exercising and she started to feel some urinary frequency and some discomfort after voiding.  She was prescribed antibiotics for 5 days and her symptoms resolved.  On Nov 2nd she started to do weight lifting again and the next morning she got her Covid Booster and she got the symptoms came back that afternoon.  The symptoms have persisted.  She had UA, Wet prep, u/s and CT scan all of which did not help her.    She started PFPT for her symptoms and reports to be doing better.  Her symptoms now are mainly a consciousness or feeling of fullness but the urgency and frequency are improved.   She tried the vaginal valium at night which helps her as well.    Previous CT abd/pelvis 11/7/21  normal       Assessment & Plan   25 y/o female with urinary frequency and pelvic pain related to pelvic floor dysfunction with some back and pelvic issues but improved with  PFPT in the setting of hip impingement. We discussed options of observation, continued PT, other medication, and vaginal valium.  She would like to continue the vaginal valium and the PT for now.  If bothered by the tingling sensation then could consider Gabapentin.  -vaginal valium  -agree with PFPT, she is starting tomorrow  -consider seeing a neurologist for some paresthesias.  -f/u prn, if symptoms worsen or progress, could consider SNS     Skylar Palafox MD  [facility]OLOGY CLINIC [place]      ==========================      Additional Coding Information:  Time spent:  33 minutes spent on the date of the encounter doing chart review, history and exam, documentation and further activities per the note              Again, thank you for allowing me to participate in the care of your patient.      Sincerely,    Skylar Palafox MD

## 2022-04-13 NOTE — PROGRESS NOTES
Reason for Visit:  Crystal Flnanery is a 26 year old female being seen for bladder pain.  Her symptoms started Oct 21st, 2021.  Right before that she had been squatting and doing heavy lifting and exercising and she started to feel some urinary frequency and some discomfort after voiding.  She was prescribed antibiotics for 5 days and her symptoms resolved.  On Nov 2nd she started to do weight lifting again and the next morning she got her Covid Booster and she got the symptoms came back that afternoon.  The symptoms have persisted.  She had UA, Wet prep, u/s and CT scan all of which did not help her.    She started PFPT for her symptoms and reports to be doing better.  Her symptoms now are mainly a consciousness or feeling of fullness but the urgency and frequency are improved.   She tried the vaginal valium at night which helps her as well.    Previous CT abd/pelvis 11/7/21  normal       Assessment & Plan   27 y/o female with urinary frequency and pelvic pain related to pelvic floor dysfunction with some back and pelvic issues but improved with PFPT in the setting of hip impingement. We discussed options of observation, continued PT, other medication, and vaginal valium.  She would like to continue the vaginal valium and the PT for now.  If bothered by the tingling sensation then could consider Gabapentin.  -vaginal valium  -agree with PFPT, she is starting tomorrow  -consider seeing a neurologist for some paresthesias.  -f/u prn, if symptoms worsen or progress, could consider SNS     Skylar Palafox MD  [facility] UROLOGY CLINIC [place]      ==========================      Additional Coding Information:  Time spent:  33 minutes spent on the date of the encounter doing chart review, history and exam, documentation and further activities per the note

## 2022-04-13 NOTE — PATIENT INSTRUCTIONS
-vaginal valium  -agree with PFPT, she is starting tomorrow  -consider seeing a neurologist for some paresthesias.  -f/u prn, if symptoms worsen or progress, could consider SNS

## 2022-04-13 NOTE — PROGRESS NOTES
Isaac is a 26 year old who is being evaluated via a billable video visit.      How would you like to obtain your AVS? MyChart  If the video visit is dropped, the invitation should be resent by: Send to e-mail at: [email or web address]  Will anyone else be joining your video visit? No      Video Start Time: 11:45 AM  Video-Visit Details    Type of service:  Video Visit    Video End Time:11:57 AM    Originating Location (pt. Location): Home    Distant Location (provider location):  [facility]     Platform used for Video Visit: [employer]

## 2022-04-13 NOTE — TELEPHONE ENCOUNTER
[facility] Call Center    Phone Message    May a detailed message be left on voicemail: yes     Reason for Call: Other: Pt called and stated she is not feeling well and would like to change her appointment into a VV. Per notes from last visit provider wanted it virtually - please call pt ASAP to futher discuss, Backline does not turn on until 8AM. Thanks!     Action Taken: Message routed to:  [facility] U[facility]    Travel Screening: Not Applicable

## 2022-04-13 NOTE — TELEPHONE ENCOUNTER
Pt was accidentally no showed on our end. Pt will still be seen. Writer sent [employer] message to pt.

## 2022-04-13 NOTE — LETTER
Date:April 14, 2022      Provider requested that no letter be sent. Do not send.       [facility]

## 2022-04-13 NOTE — TELEPHONE ENCOUNTER
"Call center called again with pt on the line. Same request. Writer sees, \"f/u in 3 months\", video is mentioned in automatic response so writer double checking with provider and team. Candi Baeza EMT working with Dr Palafox said okay for VV. Writer called and left detailed VM to pt stating VV is okay.   "

## 2022-04-22 ENCOUNTER — MEDICAL CORRESPONDENCE (OUTPATIENT)
Dept: HEALTH INFORMATION MANAGEMENT | Facility: CLINIC | Age: 27
End: 2022-04-22
Payer: COMMERCIAL

## 2022-04-26 ENCOUNTER — TRANSCRIBE ORDERS (OUTPATIENT)
Dept: OTHER | Age: 27
End: 2022-04-26
Payer: COMMERCIAL

## 2022-04-26 DIAGNOSIS — M62.89 PELVIC FLOOR DYSFUNCTION: ICD-10-CM

## 2022-04-26 DIAGNOSIS — K22.4 ESOPHAGEAL DYSMOTILITY: Primary | ICD-10-CM

## 2022-05-04 ENCOUNTER — TELEPHONE (OUTPATIENT)
Dept: NEUROLOGY | Facility: CLINIC | Age: 27
End: 2022-05-04
Payer: COMMERCIAL

## 2022-05-04 NOTE — TELEPHONE ENCOUNTER
Health Call Center    Phone Message    May a detailed message be left on voicemail: yes     Reason for Call: Question regarding specialist protocol  Please follow protocols- only utilize this documentation for questions or concerns that are not clear in the protocol.  Contact clinic directly to clarify question(s) via phone or Ecelles Carson message.   Was Clinic Available: no  Question regarding protocol:  Diagnosis on referral doesn't give clear directions on who or where to schedule this patient. Writer is unable to schedule due to diagnosis not being on protocols Esophageal dysmotility [K22.4]  Pelvic floor dysfunction [M62.89]    Please call patient's spouse (Mendel) back to schedule this referral at #[phone]    Is there a referral for the requested specialist/specialty? yes  Name of referring provider: Moriah Horne MD  Location of referring provider: [address] [address]       Action Taken: Message routed to:  [facility] Neurology    Travel Screening: Not Applicable

## 2022-05-07 ENCOUNTER — HEALTH MAINTENANCE LETTER (OUTPATIENT)
Age: 27
End: 2022-05-07

## 2022-06-07 NOTE — TELEPHONE ENCOUNTER
6/7/2022-Request for images faxed to [facility] @ 217pm    6/23/2022-2nd request for images faxed to [facility] @ 546am    6/30/2022-[facility] Images now in [facility] @ 646am      FUTURE VISIT INFORMATION      FUTURE VISIT INFORMATION:    Date: 6/30/2022    Time: 7am    Location: [facility]  REFERRAL INFORMATION:    Referring provider:  Dr. Horne     Referring providers clinic:  [facility]     Reason for visit/diagnosis  Esophageal Dysmotility     RECORDS REQUESTED FROM:       Clinic name Comments Records Status Imaging Status   [facility]-4/22/2022    [facility]-3/31/2022 Care Everywhere Requested to PACS

## 2022-06-30 ENCOUNTER — VIRTUAL VISIT (OUTPATIENT)
Dept: NEUROLOGY | Facility: CLINIC | Age: 27
End: 2022-06-30
Attending: INTERNAL MEDICINE
Payer: COMMERCIAL

## 2022-06-30 ENCOUNTER — PRE VISIT (OUTPATIENT)
Dept: NEUROLOGY | Facility: CLINIC | Age: 27
End: 2022-06-30

## 2022-06-30 DIAGNOSIS — K22.4 ESOPHAGEAL DYSMOTILITY: ICD-10-CM

## 2022-06-30 DIAGNOSIS — M62.89 PELVIC FLOOR DYSFUNCTION: ICD-10-CM

## 2022-06-30 PROCEDURE — 99205 OFFICE O/P NEW HI 60 MIN: CPT | Mod: 95 | Performed by: PSYCHIATRY & NEUROLOGY

## 2022-06-30 RX ORDER — MINOCYCLINE HYDROCHLORIDE 100 MG/1
1 CAPSULE ORAL SEE ADMIN INSTRUCTIONS
COMMUNITY
Start: 2022-05-03

## 2022-06-30 RX ORDER — AZELAIC ACID 0.15 G/G
1 GEL TOPICAL 2 TIMES DAILY
COMMUNITY
Start: 2022-04-24

## 2022-06-30 RX ORDER — TRETINOIN 0.25 MG/G
1 CREAM TOPICAL DAILY
COMMUNITY
Start: 2021-05-05

## 2022-06-30 RX ORDER — BETHANECHOL CHLORIDE 10 MG/1
2 TABLET ORAL DAILY
COMMUNITY
Start: 2021-05-10

## 2022-06-30 RX ORDER — SPIRONOLACTONE 25 MG/1
1 TABLET ORAL SEE ADMIN INSTRUCTIONS
COMMUNITY
Start: 2022-04-22

## 2022-06-30 NOTE — PROGRESS NOTES
Crystal is a 26 year old who is being evaluated via a billable video visit.      How would you like to obtain your AVS? Mychart  If the video visit is dropped, the invitation should be resent by: [phone]        Video-Visit Details    Video Start Time: 0700    Type of service:  Video Visit    Video End Time:7:54 AM    Originating Location (pt. Location): Home    Distant Location (provider location):  [facility] NEUROLOGY [facility]     Platform used for Video Visit: [email or web address]

## 2022-06-30 NOTE — PATIENT INSTRUCTIONS
MRI spinal cord (cervical thoracic)  B6, B12, Vitamin D for small fiber related issues  Follow up in-person in 6-8 weeks if possible

## 2022-06-30 NOTE — PROGRESS NOTES
[facility] Neurology Consultation    Crystal Flannery MRN# [id number]   Age: 26 year old YOB: 1995     Requesting physician: Moriah Horne     Reason for Consultation: dysphagia      History of Presenting Symptoms:   Crystal Flannery is a 26 year old female who presents today for evaluation of dysphagia.  The patient has a pertinent medical history of pelvic floor dysfunction, situational depression, migraines, R-subcapular axonal neuropathy (volleybal injury), and esophageal dysmotility.     The patient was seen with [facility]rology 10/6/2021 for muscle twitching ongoing for 2.5 months, and of which correlated with the start of Buspar.  The twitching continued after discontinuing the medication.  A few serum studies were done to investigate RLS, myokimia, and they were reported to be normal (B12, Ferritin, Ca, PTH).    She was also seen in [id number] with [facility] clinic of neurology for heaviness of her eyelid (left).  She had an MRA 12/22/2014 which was unrevealing.     Today, the patient indicates that in 12/2019 she had severe constipation for unknown reasons.  A few weeks later, she went to [place] for a tendon transfer (related to her shoulder injury) and while recovering she developed issues of increased mucous production, nasal stuffiness and heart burn.  She underwent GI related testing that she had esophageal dysmotility, and has been managing these symptoms with her providers since (PPI).  This last fall, she had what she thought was recurrent UTI but it seems this was related to pelvic floor dysfunction.  Her bladder pain has improved with better control of constipation.    Regarding her muscle twitching, it did eventually go away. The muscle twitching was primarily of her left leg and left arm.      Her bladder issues started late 10/2021.  She never had leaking, but describes only pain (burning above pelvic bone) and having a feeling of needing to go over and over but not actually going when getting on  the toilet.  These issues have lasted until 04/2022.  Upon taking Miralax, and pelvic floor physical therapy, her symptoms started to improve.      She has had Mono.  Her father had type 1 diabetes at age 50.  No atypical dietary restrictions.       Social History:   No drug abuse history. No alcohol use. No smoking. No history of STD.       Medications:   Desonide  Pantoprazole  Diazepam (vaginal)  Oxybutynin (wasn't helpful, not taking)  Sertraline (not taking)     Physical Exam:   General: Seated comfortably in no acute distress.  HEENT: Neck supple with normal range of motion.   Skin: No rashes  Neurologic:     Mental Status: Fully alert, attentive and oriented. Speech clear and fluent, no paraphasic errors.     Cranial Nerves: EOMI with normal smooth pursuit. Facial movements symmetric. Hearing not formally tested but intact to conversation.  No dysarthria.     Motor: No tremors or other abnormal movements observed.          Data: Pertinent prior to visit   Imaging:  MRI  Brain 3/31/2022:  - Reviewed today, no signs of any T2 hyperintensity, or prior encephalomalacia or signs of tumor/stroke history.    MRI lumbar spine: 12/21/2021:  - Reviewed today, no signs of neurofoarminal stenosis, cauda equina, or cord compression.    Laboratory:  3/28/2022 - SSA/SSB, RODOLFO, CRP ~ normal  3/23/2022 - TSH, BMP, CBC ~ normal  11/13/2021 - A1c ~ normal  9/10/2021 - Iron and magnesium ~ normal  6/15/2021 - B12 ~ elevated  12/2/2020 - Celiac cascade ~ normal         Assessment and Plan:   Assessment:  Esophageal dysmotility  Urinary urgency with pelvic floor dysfunction  Hx of paresthesias and myokimia (left side)    The patient has many individual issues which seem to have a reasonable diagnosis and have responded to treatment for those conditions.  However, there are some atypical issues still present w/in her history which are concerning for spinal cord insult.  Her left sided sensory and muscle issues were though to be  related to buspar, but they lasted longer than expected with discontinuing and were lateralized (both of which are atypical for a medication induced cause).  Her dysmotility occurred following a major procedure, so it could be stress induced as onset.  Given her MRI is normal, we reviewed together today, it is unlikely there is a usual presentation of MS here.  However, her symptoms all relate or can be localized to either cervical or thoracic injury, and issues like NMO or MOG should be considered along with atypical MS.  I would have her undergo imaging of the cord, as well as a few tests to look for common causes of small fiber injury.      Plan:  - B6, B12, vitamin D  - MRI cervical spine w/wout contrast  - MRI thoracic spine w/wout contrast    Follow up in Neurology clinic 2-3 months (in-person), or should new concerns arise.    TANNER Carlisle D.O.   of Neurology    Total time  today (67 min) in this patient encounter was spent on pre-charting, counseling and/or coordination of care.  The patient is in agreement with this plan and has no further questions.

## 2022-07-11 ENCOUNTER — TELEPHONE (OUTPATIENT)
Dept: NEUROLOGY | Facility: CLINIC | Age: 27
End: 2022-07-11

## 2022-07-11 NOTE — TELEPHONE ENCOUNTER
[facility] Call Center    Phone Message    May a detailed message be left on voicemail: yes     Reason for Call: Other: Pt had a video visit with Dr. Carlisle on 06/30. Pt had ask Dr. Carlisle to send pt's labs and imaging orders to [facility] in [facility]. Pt spoke with the doctor at [facility], and they have not received the orders. Please fax orders over to [facility] at Fax: [phone]     Action Taken: Message routed to:  [facility] Neurology    Travel Screening: Not Applicable

## 2022-07-29 ENCOUNTER — TELEPHONE (OUTPATIENT)
Dept: NEUROLOGY | Facility: CLINIC | Age: 27
End: 2022-07-29

## 2022-07-29 NOTE — TELEPHONE ENCOUNTER
Attempted call to patient to schedule a 2-3 months follow up in the Neurology Clinic per Dr Dylan Carlisle last visit on 06/30/22 checkout comment dispositions. Left message with clinic number and send mychart.

## 2022-07-31 ENCOUNTER — TELEPHONE (OUTPATIENT)
Dept: NEUROLOGY | Facility: CLINIC | Age: 27
End: 2022-07-31

## 2022-07-31 NOTE — TELEPHONE ENCOUNTER
2nd attempted call to patient to schedule a 2-3 months follow up in the Neurology Clinic per Dr Dylan Carlisle last visit on 06/30/22 checkout comment dispositions. Left message with clinic number

## 2022-08-02 ENCOUNTER — TELEPHONE (OUTPATIENT)
Dept: NEUROLOGY | Facility: CLINIC | Age: 27
End: 2022-08-02

## 2022-08-02 NOTE — TELEPHONE ENCOUNTER
I called pt and left  voicemail I offered 9/27 at 8, 8:30,9:00 or 9:30 for a virtual visit with Dr Carlisle.            [facility] Call Center    Phone Message    May a detailed message be left on voicemail: yes     Reason for Call: Other: Pt calling in returning a missed call from [facility] to schedule a 2 month follow up, writer offered pt next available (01/2023) pt informed that Dr Carlisle informed her this would happen and to ask for a smaller time slot to be fit in however the call center is unable to do this please call pt back for scheduling or if call center has permission to manually schedule please leave date and time we can fit patient in. Thank You      Action Taken: Message routed to:  [facility] neurology    Travel Screening: Not Applicable

## 2022-08-02 NOTE — TELEPHONE ENCOUNTER
I call pt and left detailed voicemail I offered 9/27 at 8, 8:30,9:00 or 9:30 for a virtual visit with Dr Carlisle.

## 2022-09-21 ENCOUNTER — LAB (OUTPATIENT)
Dept: LAB | Facility: CLINIC | Age: 27
End: 2022-09-21
Payer: COMMERCIAL

## 2022-09-21 DIAGNOSIS — M62.89 PELVIC FLOOR DYSFUNCTION: ICD-10-CM

## 2022-09-21 DIAGNOSIS — K22.4 ESOPHAGEAL DYSMOTILITY: ICD-10-CM

## 2022-09-21 LAB
DEPRECATED CALCIDIOL+CALCIFEROL SERPL-MC: 43 UG/L (ref 20–75)
VIT B12 SERPL-MCNC: 902 PG/ML (ref 232–1245)

## 2022-09-21 PROCEDURE — 36415 COLL VENOUS BLD VENIPUNCTURE: CPT

## 2022-09-21 PROCEDURE — 84207 ASSAY OF VITAMIN B-6: CPT

## 2022-09-21 PROCEDURE — 82306 VITAMIN D 25 HYDROXY: CPT

## 2022-09-21 PROCEDURE — 82607 VITAMIN B-12: CPT

## 2022-09-22 ENCOUNTER — VIRTUAL VISIT (OUTPATIENT)
Dept: NEUROLOGY | Facility: CLINIC | Age: 27
End: 2022-09-22
Payer: COMMERCIAL

## 2022-09-22 DIAGNOSIS — K22.4 ESOPHAGEAL DYSMOTILITY: Primary | ICD-10-CM

## 2022-09-22 DIAGNOSIS — M62.89 PELVIC FLOOR DYSFUNCTION: ICD-10-CM

## 2022-09-22 PROCEDURE — 99214 OFFICE O/P EST MOD 30 MIN: CPT | Mod: 95 | Performed by: PSYCHIATRY & NEUROLOGY

## 2022-09-22 RX ORDER — LOTEPREDNOL ETABONATE 5 MG/ML
1 SUSPENSION/ DROPS OPHTHALMIC SEE ADMIN INSTRUCTIONS
COMMUNITY
Start: 2022-07-28

## 2022-09-22 NOTE — PROGRESS NOTES
[facility] Neurology Follow Up Visit    Crystal Flannery MRN# [id number]   Age: 26 year old YOB: 1995     Brief history of symptoms: The patient was initially seen in neurologic consultation on 6/30/2022 for evaluation of dysphagia. Please see the comprehensive neurologic consultation notes from those dates in the Epic records for details.     She described odd muscle twitching which lasted even after buspar was stopped.  She was to have MRI cervical and thoracic spine to make sure no major spinal lesions were present and leading to her twitching, paresthesias, and esophageal dysmotility.    Interval history:   - Imaging was done through [facility] and I am unable to review the images today, other than reports.   - MRI thoracic spine 7/20/2022 with normal cord signal   - MRI cervical spine 7/20/2022 with normal cord signal  - Serum studies are not fully completed: B12, Vitamin D were normal. B6 is still processing.    Today, the patient reports that her twitching has stopped completely.  She still has her esophgeal dysmotility, but her pelvic related issues have improved to almost completely resolved.         Assessment and Plan:   Assessment:  Esophageal dysmotility  Pelvic floor dysfunction    There is no clear signs on imaging, or other testing for a neurological etiology behind the patient's symptoms.  We discussed if new focal neurological deficits did occur, I would want to see her again to discuss repeat testing if needed.    Plan:  Follow up in Neurology clinic as needed should new concerns arise.    TANNER Carlisle D.O.   of Neurology    Total time today (30 min) in this patient encounter was spent on pre-charting, counseling and/or coordination of care.

## 2022-09-22 NOTE — LETTER
Date:September 23, 2022      Provider requested that no letter be sent. Do not send.       [facility]

## 2022-09-22 NOTE — PROGRESS NOTES
Crystal is a 26 year old who is being evaluated via a billable video visit.      How would you like to obtain your AVS? Mychart  If the video visit is dropped, the invitation should be resent by: [phone]        Video-Visit Details    Video Start Time: 0730    Type of service:  Video Visit    Video End Time:8:02 AM    Originating Location (pt. Location): Home    Distant Location (provider location):  [facility] NEUROLOGY [facility]     Platform used for Video Visit: Orbot[employer]

## 2022-09-22 NOTE — LETTER
9/22/2022       RE: Crystal Flannery  [address]  [place] [address]     Dear Colleague,    Thank you for referring your patient, Crystal Flannery, to the [facility] NEUROLOGY CLINIC [facility]. Please see a copy of my visit note below.    [facility] Neurology Follow Up Visit    Crystal Flannery MRN# [id number]   Age: 26 year old YOB: 1995     Brief history of symptoms: The patient was initially seen in neurologic consultation on 6/30/2022 for evaluation of dysphagia. Please see the comprehensive neurologic consultation notes from those dates in the Epic records for details.     She described odd muscle twitching which lasted even after buspar was stopped.  She was to have MRI cervical and thoracic spine to make sure no major spinal lesions were present and leading to her twitching, paresthesias, and esophageal dysmotility.    Interval history:   - Imaging was done through [facility] and I am unable to review the images today, other than reports.   - MRI thoracic spine 7/20/2022 with normal cord signal   - MRI cervical spine 7/20/2022 with normal cord signal  - Serum studies are not fully completed: B12, Vitamin D were normal. B6 is still processing.    Today, the patient reports that her twitching has stopped completely.  She still has her esophgeal dysmotility, but her pelvic related issues have improved to almost completely resolved.         Assessment and Plan:   Assessment:  Esophageal dysmotility  Pelvic floor dysfunction    There is no clear signs on imaging, or other testing for a neurological etiology behind the patient's symptoms.  We discussed if new focal neurological deficits did occur, I would want to see her again to discuss repeat testing if needed.    Plan:  Follow up in Neurology clinic as needed should new concerns arise.    TANNER Carlisle D.O.   of Neurology    Total time today (30 min)  in this patient encounter was spent on pre-charting, counseling and/or coordination of care.       Crystal is a 26 year old who is being evaluated via a billable video visit.      How would you like to obtain your AVS? Vielkahart  If the video visit is dropped, the invitation should be resent by: [phone]        Video-Visit Details    Video Start Time: 0730    Type of service:  Video Visit    Video End Time:8:02 AM    Originating Location (pt. Location): Home    Distant Location (provider location):  [facility] NEUROLOGY [facility]     Platform used for Video Visit: DenisseWell        Again, thank you for allowing me to participate in the care of your patient.      Sincerely,    Dylan Carlisle, DO

## 2022-09-26 LAB — PYRIDOXAL PHOS SERPL-SCNC: 67 NMOL/L

## 2023-04-22 ENCOUNTER — HEALTH MAINTENANCE LETTER (OUTPATIENT)
Age: 28
End: 2023-04-22

## 2024-06-29 ENCOUNTER — HEALTH MAINTENANCE LETTER (OUTPATIENT)
Age: 29
End: 2024-06-29

## 2025-07-13 ENCOUNTER — HEALTH MAINTENANCE LETTER (OUTPATIENT)
Age: 30
End: 2025-07-13